# Patient Record
Sex: FEMALE | Race: WHITE | URBAN - NONMETROPOLITAN AREA
[De-identification: names, ages, dates, MRNs, and addresses within clinical notes are randomized per-mention and may not be internally consistent; named-entity substitution may affect disease eponyms.]

---

## 2020-09-28 ENCOUNTER — IMPORTED ENCOUNTER (OUTPATIENT)
Dept: URBAN - NONMETROPOLITAN AREA CLINIC 1 | Facility: CLINIC | Age: 40
End: 2020-09-28

## 2020-09-28 PROBLEM — S05.02XA: Noted: 2020-09-28

## 2020-09-28 PROCEDURE — S0620 ROUTINE OPHTHALMOLOGICAL EXA: HCPCS

## 2020-09-28 NOTE — PATIENT DISCUSSION
*CORNEAL ABRASION:. os-  Discussed findings of exam in detail with the patient. -  Discussed the acute nature and treatment options with the patient. -  Discussed the importance of follow-up and the risk of serious infection   The patient was warned to return to the office immediately if they experience loss of vision or increased pain. -  The use of preserved artificial tears  was discussed with patient. insert bcl ospt to remove bcl in 2 days on ownstart besi bid os x 3 days

## 2020-09-30 ENCOUNTER — IMPORTED ENCOUNTER (OUTPATIENT)
Dept: URBAN - NONMETROPOLITAN AREA CLINIC 1 | Facility: CLINIC | Age: 40
End: 2020-09-30

## 2020-09-30 PROBLEM — S05.02XD: Noted: 2020-09-30

## 2020-09-30 PROCEDURE — 99212 OFFICE O/P EST SF 10 MIN: CPT

## 2020-09-30 NOTE — PATIENT DISCUSSION
*CORNEAL ABRASION:. os mild improvement with flare of iritis-  Discussed findings of exam in detail with the patient. -  Discussed the acute nature and treatment options with the patient. -  Discussed the importance of follow-up and the risk of serious infection   The patient was warned to return to the office immediately if they experience loss of vision or increased pain. -  The use of preserved artificial tears  was discussed with patient. insert bcl osremoved bcl osstart cathi chris bid with pressure patchingstart atropine tid os

## 2020-10-02 ENCOUNTER — IMPORTED ENCOUNTER (OUTPATIENT)
Dept: URBAN - NONMETROPOLITAN AREA CLINIC 1 | Facility: CLINIC | Age: 40
End: 2020-10-02

## 2020-10-02 NOTE — PATIENT DISCUSSION
*CORNEAL ABRASION:. os improved greatly-  Discussed findings of exam in detail with the patient. -  Discussed the acute nature and treatment options with the patient. -  Discussed the importance of follow-up and the risk of serious infection   The patient was warned to return to the office immediately if they experience loss of vision or increased pain. -  The use of preserved artificial tears  was discussed with patient. insert bcl oscont cathi chris bid with pressure patching x 3 days then stopd/c atropine tid oscont tears after patch qid os

## 2020-10-08 ENCOUNTER — IMPORTED ENCOUNTER (OUTPATIENT)
Dept: URBAN - NONMETROPOLITAN AREA CLINIC 1 | Facility: CLINIC | Age: 40
End: 2020-10-08

## 2020-10-08 NOTE — PATIENT DISCUSSION
*CORNEAL ABRASION:. os improved greatly-  Discussed findings of exam in detail with the patient. -  Discussed the acute nature and treatment options with the patient. -  Discussed the importance of follow-up and the risk of serious infection   The patient was warned to return to the office immediately if they experience loss of vision or increased pain. -  The use of preserved artificial tears  was discussed with patient. cont tears after patch qid os

## 2020-10-29 ENCOUNTER — IMPORTED ENCOUNTER (OUTPATIENT)
Dept: URBAN - NONMETROPOLITAN AREA CLINIC 1 | Facility: CLINIC | Age: 40
End: 2020-10-29

## 2020-10-29 PROBLEM — H17.12: Noted: 2020-10-29

## 2021-02-09 ENCOUNTER — IMPORTED ENCOUNTER (OUTPATIENT)
Dept: URBAN - NONMETROPOLITAN AREA CLINIC 1 | Facility: CLINIC | Age: 41
End: 2021-02-09

## 2021-02-09 PROBLEM — H52.13: Noted: 2021-02-09

## 2021-02-09 PROBLEM — H17.12: Noted: 2020-10-29

## 2021-02-09 PROCEDURE — S0621 ROUTINE OPHTHALMOLOGICAL EXA: HCPCS

## 2021-02-09 NOTE — PATIENT DISCUSSION
CORNEAL SCAR OSEDUCATE PTMONITOR for changesMyopia-Discussed diagnosis with patient. -Explained that people who are myopic are at a higher risk for developing RD/RT and reviewed associated S&S.-Pt to contact our office if symptoms develop. Updated spec Rx given. Recommend lens that will provide comfort as well as protect safety and health of eyes.

## 2022-04-10 ASSESSMENT — VISUAL ACUITY
OS_PH: 20/30
OS_CC: 20/60-
OU_CC: 20/20
OD_SC: 20/20
OS_SC: 20/200 BLURRY
OD_SC: 20/30
OS_CC: 20/60
OD_SC: 20/25
OD_SC: 20/30
OS_SC: 20/200
OS_SC: 20/50+2

## 2022-04-10 ASSESSMENT — TONOMETRY
OD_IOP_MMHG: 17
OS_IOP_MMHG: 17

## 2025-06-05 ENCOUNTER — COMPREHENSIVE EXAM (OUTPATIENT)
Age: 45
End: 2025-06-05

## 2025-06-05 DIAGNOSIS — H52.13: ICD-10-CM

## 2025-06-05 DIAGNOSIS — H17.12: ICD-10-CM

## 2025-06-05 PROCEDURE — S0620AEC ROUTINE OPH EXAM INCLUDES REF/ NEW PATIENT
